# Patient Record
Sex: FEMALE | URBAN - METROPOLITAN AREA
[De-identification: names, ages, dates, MRNs, and addresses within clinical notes are randomized per-mention and may not be internally consistent; named-entity substitution may affect disease eponyms.]

---

## 2023-12-01 ENCOUNTER — APPOINTMENT (RX ONLY)
Dept: URBAN - METROPOLITAN AREA CLINIC 11 | Facility: CLINIC | Age: 38
Setting detail: DERMATOLOGY
End: 2023-12-01

## 2023-12-01 DIAGNOSIS — Z41.9 ENCOUNTER FOR PROCEDURE FOR PURPOSES OTHER THAN REMEDYING HEALTH STATE, UNSPECIFIED: ICD-10-CM

## 2023-12-01 DIAGNOSIS — Z41.1 ENCOUNTER FOR COSMETIC SURGERY: ICD-10-CM

## 2023-12-01 PROCEDURE — ? CONSULTATION - BLEPHAROPLASTY

## 2023-12-01 PROCEDURE — ? FILLERS

## 2023-12-01 PROCEDURE — Z1093: HCPCS

## 2023-12-01 ASSESSMENT — LOCATION SIMPLE DESCRIPTION DERM
LOCATION SIMPLE: LEFT INFERIOR EYELID
LOCATION SIMPLE: RIGHT INFERIOR EYELID

## 2023-12-01 ASSESSMENT — LOCATION DETAILED DESCRIPTION DERM
LOCATION DETAILED: RIGHT LATERAL INFERIOR EYELID
LOCATION DETAILED: LEFT LATERAL INFERIOR EYELID

## 2023-12-01 ASSESSMENT — LOCATION ZONE DERM: LOCATION ZONE: EYELID

## 2023-12-01 NOTE — PROCEDURE: FILLERS
Additional Area 4 Volume In Cc: 0
Topical Anesthetic #2: prilocaine
Price $ (Numeric Only, No Text Please.): 261
Detail Level: Simple
Expiration Date (Month Year): 11/30/2025
Topical Anesthesia?: yes
Topical Anesthetic #1: lidocaine
Map Statment: See attached map for complete details
Topical Anesthetic Base: cream
Cheeks Filler Volume In Cc: 1
Use Map Statement For Sites (Optional): No
Additional Area 1 Location: lips
Lot #: [id number]
Topical Anesthetic #3: phenenylephrine
Injected Anesthesia In Cc: 0.2
Filler: Emma Rascon ([facility]-L)
Consent: Verbal and written consent obtained. Risks include but not limited to bruising, beading, irregular texture, ulceration, infection, allergic reaction, scar formation, incomplete augmentation, temporary nature, procedural pain.
Administered By (Optional): Dr. Gama Gustafson

## 2023-12-01 NOTE — PROCEDURE: CONSULTATION - BLEPHAROPLASTY
Detail Level: Detailed
Procedure Quote $ (Will Render In Note. Use Numbers Only, No Text Please.): 4000
Other Plan Od: bilateral lower lid blepharoplasty with fat transfer to cheeks
Consultation Charge $ (Use Numbers Only, No Text Please.): 0
Send Procedure Quote As Charge: No

## 2023-12-12 ENCOUNTER — APPOINTMENT (RX ONLY)
Dept: URBAN - METROPOLITAN AREA CLINIC 11 | Facility: CLINIC | Age: 38
Setting detail: DERMATOLOGY
End: 2023-12-12

## 2023-12-12 DIAGNOSIS — Z428 OTHER AFTERCARE INVOLVING THE USE OF PLASTIC SURGERY: ICD-10-CM

## 2023-12-12 PROCEDURE — ? PATIENT SPECIFIC COUNSELING

## 2023-12-12 PROCEDURE — 99003: CPT

## 2023-12-12 ASSESSMENT — LOCATION DETAILED DESCRIPTION DERM
LOCATION DETAILED: LEFT CENTRAL MALAR CHEEK
LOCATION DETAILED: RIGHT CENTRAL MALAR CHEEK

## 2023-12-12 ASSESSMENT — LOCATION SIMPLE DESCRIPTION DERM
LOCATION SIMPLE: LEFT CHEEK
LOCATION SIMPLE: RIGHT CHEEK

## 2023-12-12 ASSESSMENT — LOCATION ZONE DERM: LOCATION ZONE: FACE

## 2023-12-12 NOTE — PROCEDURE: PATIENT SPECIFIC COUNSELING
Other (Free Text): Exam within normal limits. Informed patient to RTC in 6 months for additional follow up
Detail Level: Simple